# Patient Record
Sex: FEMALE | Race: BLACK OR AFRICAN AMERICAN | NOT HISPANIC OR LATINO
[De-identification: names, ages, dates, MRNs, and addresses within clinical notes are randomized per-mention and may not be internally consistent; named-entity substitution may affect disease eponyms.]

---

## 2017-07-17 ENCOUNTER — APPOINTMENT (OUTPATIENT)
Dept: RHEUMATOLOGY | Facility: CLINIC | Age: 42
End: 2017-07-17

## 2017-11-21 ENCOUNTER — APPOINTMENT (OUTPATIENT)
Dept: RHEUMATOLOGY | Facility: CLINIC | Age: 42
End: 2017-11-21

## 2018-06-25 ENCOUNTER — APPOINTMENT (OUTPATIENT)
Dept: RHEUMATOLOGY | Facility: CLINIC | Age: 43
End: 2018-06-25

## 2018-07-03 ENCOUNTER — APPOINTMENT (OUTPATIENT)
Dept: RHEUMATOLOGY | Facility: CLINIC | Age: 43
End: 2018-07-03
Payer: COMMERCIAL

## 2018-07-03 ENCOUNTER — LABORATORY RESULT (OUTPATIENT)
Age: 43
End: 2018-07-03

## 2018-07-03 ENCOUNTER — TRANSCRIPTION ENCOUNTER (OUTPATIENT)
Age: 43
End: 2018-07-03

## 2018-07-03 ENCOUNTER — MEDICATION RENEWAL (OUTPATIENT)
Age: 43
End: 2018-07-03

## 2018-07-03 VITALS
WEIGHT: 162 LBS | TEMPERATURE: 98 F | OXYGEN SATURATION: 98 % | HEART RATE: 80 BPM | HEIGHT: 68 IN | BODY MASS INDEX: 24.55 KG/M2 | SYSTOLIC BLOOD PRESSURE: 156 MMHG | DIASTOLIC BLOOD PRESSURE: 92 MMHG

## 2018-07-03 DIAGNOSIS — R59.9 ENLARGED LYMPH NODES, UNSPECIFIED: ICD-10-CM

## 2018-07-03 LAB
APTT BLD: 32.1 SEC
INR PPP: 1.12 RATIO
PT BLD: 12.7 SEC

## 2018-07-03 PROCEDURE — 99215 OFFICE O/P EST HI 40 MIN: CPT

## 2018-07-04 LAB
ALBUMIN SERPL ELPH-MCNC: 4.3 G/DL
ALP BLD-CCNC: 78 U/L
ALT SERPL-CCNC: 8 U/L
ANION GAP SERPL CALC-SCNC: 11 MMOL/L
APPEARANCE: CLEAR
AST SERPL-CCNC: 17 U/L
BACTERIA: NEGATIVE
BASOPHILS # BLD AUTO: 0.02 K/UL
BASOPHILS NFR BLD AUTO: 0.3 %
BILIRUB SERPL-MCNC: 0.4 MG/DL
BILIRUBIN URINE: NEGATIVE
BLOOD URINE: NEGATIVE
BUN SERPL-MCNC: 10 MG/DL
C3 SERPL-MCNC: 117 MG/DL
C4 SERPL-MCNC: 10 MG/DL
CALCIUM SERPL-MCNC: 9.8 MG/DL
CHLORIDE SERPL-SCNC: 100 MMOL/L
CK SERPL-CCNC: 129 U/L
CO2 SERPL-SCNC: 26 MMOL/L
COLOR: YELLOW
CREAT SERPL-MCNC: 0.76 MG/DL
CREAT SPEC-SCNC: 20 MG/DL
CREAT/PROT UR: NORMAL
DSDNA AB SER-ACNC: <12 IU/ML
EOSINOPHIL # BLD AUTO: 0.14 K/UL
EOSINOPHIL NFR BLD AUTO: 2.1 %
GLUCOSE QUALITATIVE U: NEGATIVE MG/DL
GLUCOSE SERPL-MCNC: 85 MG/DL
HCT VFR BLD CALC: 31.6 %
HGB BLD-MCNC: 9 G/DL
IMM GRANULOCYTES NFR BLD AUTO: 0.2 %
IRON SATN MFR SERPL: 6 %
IRON SERPL-MCNC: 20 UG/DL
KETONES URINE: NEGATIVE
LEUKOCYTE ESTERASE URINE: NEGATIVE
LYMPHOCYTES # BLD AUTO: 1.62 K/UL
LYMPHOCYTES NFR BLD AUTO: 24.8 %
MAN DIFF?: NORMAL
MCHC RBC-ENTMCNC: 19.4 PG
MCHC RBC-ENTMCNC: 28.5 GM/DL
MCV RBC AUTO: 68 FL
MICROSCOPIC-UA: NORMAL
MONOCYTES # BLD AUTO: 0.57 K/UL
MONOCYTES NFR BLD AUTO: 8.7 %
NEUTROPHILS # BLD AUTO: 4.18 K/UL
NEUTROPHILS NFR BLD AUTO: 63.9 %
NITRITE URINE: NEGATIVE
PH URINE: 6.5
PLATELET # BLD AUTO: 344 K/UL
POTASSIUM SERPL-SCNC: 4.2 MMOL/L
PROT SERPL-MCNC: 8.2 G/DL
PROT UR-MCNC: <4 MG/DL
PROTEIN URINE: NEGATIVE MG/DL
RBC # BLD: 4.65 M/UL
RBC # BLD: 4.65 M/UL
RBC # FLD: 19.4 %
RED BLOOD CELLS URINE: 0 /HPF
RETICS # AUTO: 0.8 %
RETICS AGGREG/RBC NFR: 37.3 K/UL
SODIUM SERPL-SCNC: 137 MMOL/L
SPECIFIC GRAVITY URINE: 1.01
SQUAMOUS EPITHELIAL CELLS: 1 /HPF
TIBC SERPL-MCNC: 358 UG/DL
TSH SERPL-ACNC: 1.65 UIU/ML
UIBC SERPL-MCNC: 338 UG/DL
UROBILINOGEN URINE: NEGATIVE MG/DL
WBC # FLD AUTO: 6.54 K/UL
WHITE BLOOD CELLS URINE: 0 /HPF

## 2018-07-05 LAB
ENA RNP AB SER IA-ACNC: >8 AL
ENA SM AB SER IA-ACNC: 0.4 AL
ENA SS-A AB SER IA-ACNC: 0.2 AL
ENA SS-B AB SER IA-ACNC: <0.2 AL

## 2018-07-06 LAB
B2 GLYCOPROT1 AB SER QL: NEGATIVE
B2 GLYCOPROT1 IGA SERPL IA-ACNC: <5 SAU
CARDIOLIPIN AB SER IA-ACNC: NEGATIVE

## 2019-07-16 ENCOUNTER — RX RENEWAL (OUTPATIENT)
Age: 44
End: 2019-07-16

## 2019-07-16 RX ORDER — IRON PS COMPLEX/B12/FOLIC ACID 150-25-1
150-1-25 CAPSULE ORAL
Qty: 90 | Refills: 3 | Status: ACTIVE | COMMUNITY
Start: 2019-07-16 | End: 1900-01-01

## 2019-10-14 ENCOUNTER — APPOINTMENT (OUTPATIENT)
Dept: RHEUMATOLOGY | Facility: CLINIC | Age: 44
End: 2019-10-14

## 2020-12-28 ENCOUNTER — TRANSCRIPTION ENCOUNTER (OUTPATIENT)
Age: 45
End: 2020-12-28

## 2021-01-04 ENCOUNTER — APPOINTMENT (OUTPATIENT)
Dept: RHEUMATOLOGY | Facility: CLINIC | Age: 46
End: 2021-01-04

## 2021-09-13 ENCOUNTER — APPOINTMENT (OUTPATIENT)
Dept: RHEUMATOLOGY | Facility: CLINIC | Age: 46
End: 2021-09-13
Payer: COMMERCIAL

## 2021-09-13 VITALS
TEMPERATURE: 98.3 F | BODY MASS INDEX: 25.76 KG/M2 | WEIGHT: 170 LBS | HEIGHT: 68 IN | HEART RATE: 80 BPM | SYSTOLIC BLOOD PRESSURE: 161 MMHG | RESPIRATION RATE: 17 BRPM | OXYGEN SATURATION: 98 % | DIASTOLIC BLOOD PRESSURE: 96 MMHG

## 2021-09-13 DIAGNOSIS — K21.9 GASTRO-ESOPHAGEAL REFLUX DISEASE W/OUT ESOPHAGITIS: ICD-10-CM

## 2021-09-13 PROCEDURE — 99215 OFFICE O/P EST HI 40 MIN: CPT

## 2021-09-14 LAB
ALBUMIN SERPL ELPH-MCNC: 4.4 G/DL
ALP BLD-CCNC: 75 U/L
ALT SERPL-CCNC: 10 U/L
ANION GAP SERPL CALC-SCNC: 11 MMOL/L
APPEARANCE: CLEAR
APTT BLD: 35.6 SEC
AST SERPL-CCNC: 16 U/L
BACTERIA: NEGATIVE
BASOPHILS # BLD AUTO: 0.03 K/UL
BASOPHILS NFR BLD AUTO: 0.5 %
BILIRUB SERPL-MCNC: 0.4 MG/DL
BILIRUBIN URINE: NEGATIVE
BLOOD URINE: NEGATIVE
BUN SERPL-MCNC: 11 MG/DL
C3 SERPL-MCNC: 105 MG/DL
C4 SERPL-MCNC: 8 MG/DL
CALCIUM SERPL-MCNC: 9.6 MG/DL
CHLORIDE SERPL-SCNC: 102 MMOL/L
CK SERPL-CCNC: 137 U/L
CO2 SERPL-SCNC: 26 MMOL/L
COLOR: COLORLESS
CONFIRM: 30.5 SEC
COVID-19 SPIKE DOMAIN ANTIBODY INTERPRETATION: POSITIVE
CREAT SERPL-MCNC: 0.71 MG/DL
CREAT SPEC-SCNC: 26 MG/DL
CREAT/PROT UR: 0.3 RATIO
DRVVT IMM 1:2 NP PPP: NORMAL
DRVVT SCREEN TO CONFIRM RATIO: 0.82 RATIO
DSDNA AB SER-ACNC: <12 IU/ML
EOSINOPHIL # BLD AUTO: 0.05 K/UL
EOSINOPHIL NFR BLD AUTO: 0.8 %
GLUCOSE QUALITATIVE U: NEGATIVE
HCT VFR BLD CALC: 35.9 %
HGB BLD-MCNC: 10.9 G/DL
HYALINE CASTS: 0 /LPF
IMM GRANULOCYTES NFR BLD AUTO: 0.3 %
INR PPP: 1.1 RATIO
KETONES URINE: NEGATIVE
LEUKOCYTE ESTERASE URINE: NEGATIVE
LYMPHOCYTES # BLD AUTO: 1.72 K/UL
LYMPHOCYTES NFR BLD AUTO: 27 %
MAN DIFF?: NORMAL
MCHC RBC-ENTMCNC: 26.1 PG
MCHC RBC-ENTMCNC: 30.4 GM/DL
MCV RBC AUTO: 86.1 FL
MICROSCOPIC-UA: NORMAL
MONOCYTES # BLD AUTO: 0.83 K/UL
MONOCYTES NFR BLD AUTO: 13.1 %
NEUTROPHILS # BLD AUTO: 3.71 K/UL
NEUTROPHILS NFR BLD AUTO: 58.3 %
NITRITE URINE: NEGATIVE
PH URINE: 6.5
PLATELET # BLD AUTO: 271 K/UL
POTASSIUM SERPL-SCNC: 4.4 MMOL/L
PROT SERPL-MCNC: 7.7 G/DL
PROT UR-MCNC: 8 MG/DL
PROTEIN URINE: NEGATIVE
PT BLD: 12.9 SEC
RBC # BLD: 4.17 M/UL
RBC # FLD: 16.9 %
RED BLOOD CELLS URINE: 0 /HPF
SARS-COV-2 AB SERPL IA-ACNC: >250 U/ML
SCREEN DRVVT: 33.4 SEC
SILICA CLOTTING TIME INTERPRETATION: NORMAL
SILICA CLOTTING TIME S/C: 0.8 RATIO
SODIUM SERPL-SCNC: 139 MMOL/L
SPECIFIC GRAVITY URINE: 1
SQUAMOUS EPITHELIAL CELLS: 2 /HPF
TSH SERPL-ACNC: 1.29 UIU/ML
UROBILINOGEN URINE: NORMAL
WBC # FLD AUTO: 6.36 K/UL
WHITE BLOOD CELLS URINE: 1 /HPF

## 2021-09-15 ENCOUNTER — TRANSCRIPTION ENCOUNTER (OUTPATIENT)
Age: 46
End: 2021-09-15

## 2021-09-15 LAB
B2 GLYCOPROT1 IGA SERPL IA-ACNC: <5 SAU
B2 GLYCOPROT1 IGG SER-ACNC: <5 SGU
B2 GLYCOPROT1 IGM SER-ACNC: <5 SMU
CARDIOLIPIN IGM SER-MCNC: 13.9 MPL
CARDIOLIPIN IGM SER-MCNC: <5 GPL
DEPRECATED CARDIOLIPIN IGA SER: <5 APL
ENA RNP AB SER IA-ACNC: >8 AL
ENA SM AB SER IA-ACNC: <0.2 AL
ENA SS-A AB SER IA-ACNC: <0.2 AL
ENA SS-B AB SER IA-ACNC: <0.2 AL
MYELOPEROXIDASE AB SER QL IA: <5 UNITS
MYELOPEROXIDASE CELLS FLD QL: NEGATIVE
PROTEINASE3 AB SER IA-ACNC: <5 UNITS
PROTEINASE3 AB SER-ACNC: NEGATIVE

## 2021-09-16 ENCOUNTER — TRANSCRIPTION ENCOUNTER (OUTPATIENT)
Age: 46
End: 2021-09-16

## 2022-11-01 ENCOUNTER — APPOINTMENT (OUTPATIENT)
Dept: RHEUMATOLOGY | Facility: CLINIC | Age: 47
End: 2022-11-01

## 2023-01-10 ENCOUNTER — APPOINTMENT (OUTPATIENT)
Dept: RHEUMATOLOGY | Facility: CLINIC | Age: 48
End: 2023-01-10
Payer: COMMERCIAL

## 2023-01-10 VITALS
SYSTOLIC BLOOD PRESSURE: 177 MMHG | DIASTOLIC BLOOD PRESSURE: 105 MMHG | OXYGEN SATURATION: 98 % | RESPIRATION RATE: 18 BRPM | BODY MASS INDEX: 25.54 KG/M2 | WEIGHT: 168 LBS | HEART RATE: 94 BPM

## 2023-01-10 DIAGNOSIS — M32.9 SYSTEMIC LUPUS ERYTHEMATOSUS, UNSPECIFIED: ICD-10-CM

## 2023-01-10 DIAGNOSIS — M60.9 MYOSITIS, UNSPECIFIED: ICD-10-CM

## 2023-01-10 DIAGNOSIS — I10 ESSENTIAL (PRIMARY) HYPERTENSION: ICD-10-CM

## 2023-01-10 DIAGNOSIS — M19.90 UNSPECIFIED OSTEOARTHRITIS, UNSPECIFIED SITE: ICD-10-CM

## 2023-01-10 PROCEDURE — 99214 OFFICE O/P EST MOD 30 MIN: CPT

## 2023-01-10 NOTE — ASSESSMENT
[FreeTextEntry1] : 1. SLE:  generally controlled.  No fever, arthritis, chest pain, rash, edema, alopecia, oral ulcers.  She had profound fatigue and about a 5 lb weight loss in prior years.  She stopped Plaquenil in .\par 2. Myositis: no weakness\par 3. Pregnancy: delivered third week of 2013 by  at term because of pre-eclampsia treated with MgSO4 (hypertension).  Was not given any antihypertensives at discharge.  Baby and mother did fine with the exception of hypertension developing in the mother (see below). The baby is doing well.\par 4. Chronic Plaquenil use: no visual symptoms\par 5. Hypertension:  BP arnaud after discharge requiring the initiation of an antihypertensive (amlodipine).  The dose was increased to 10 mg/d with eventual control of BP.  Eventually the drug was stopped.  She has not had any swelling or dyspnea.  She has not taken her BP meds on a regular basis, which is the case for the BP measurements taken in 2023.  \par 6.  Knee arthralgias/arthritis\par 7.  Vit D deficiency\par 8.  Adenopathy\par 9.  Arthritis: wrists, hands, knees\par 10. Social: father  in \par \par Plan:\par 1.  Lab tests\par 2.  Restart hydroxychloroquine\par 3.  Eye doctor\par 4.  Take valsartan daily; if BP still high, increase the dose\par 5.  Return 1-2 months\par 6.  Systemic Lupus Erythematosus, known as lupus, is a chronic autoimmune disease that can affect any organ in the body posing threats to proper organ function and even to life. Therefore, close surveillance of all bodily functions is required, including but not limited to central and peripheral nervous system, ocular and auditory systems, cardiopulmonary function, kidney function, mucocutaneous and musculoskeletal systems as well as constitutional manifestations. Surveillance consists of history, physical, and laboratory tests. Treatment varies, but most of the drugs used are high risk and therefore also require close monitoring in the form of blood and urine tests.\par

## 2023-01-10 NOTE — HISTORY OF PRESENT ILLNESS
[FreeTextEntry1] : 1. SLE:  generally controlled.  No fever, arthritis, chest pain, rash, edema, alopecia, oral ulcers.  She had profound fatigue and about a 5 lb weight loss in prior years.  She stopped Plaquenil in .\par 2. Myositis: no weakness\par 3. Pregnancy: delivered third week of 2013 by  at term because of pre-eclampsia treated with MgSO4 (hypertension).  Was not given any antihypertensives at discharge.  Baby and mother did fine with the exception of hypertension developing in the mother (see below). The baby is doing well.\par 4. Chronic Plaquenil use: no visual symptoms\par 5. Hypertension:  BP arnaud after discharge requiring the initiation of an antihypertensive (amlodipine).  The dose was increased to 10 mg/d with eventual control of BP.  Eventually the drug was stopped.  She has not had any swelling or dyspnea.  She has not taken her BP meds on a regular basis, which is the case for the BP measurements taken in 2023.  \par 6.  Knee arthralgias/arthritis\par 7.  Vit D deficiency\par 8.  Adenopathy\par 9.  Arthritis: wrists, hands, knees\par 10. Social: father  in \par \par Meds\par valsartan 160 mg/d stopped\par Plaquenil 400 mg daily stopped\par Iron prn \par MVI stopped\par \par Vaccines\par Prevnar 13  16   ([id number]; exp )

## 2023-01-10 NOTE — PHYSICAL EXAM
[General Appearance - Alert] : alert [General Appearance - In No Acute Distress] : in no acute distress [General Appearance - Well Nourished] : well nourished [General Appearance - Well Developed] : well developed [General Appearance - Well-Appearing] : healthy appearing [Sclera] : the sclera and conjunctiva were normal [Extraocular Movements] : extraocular movements were intact [Strabismus] : no strabismus was seen [Outer Ear] : the ears and nose were normal in appearance [Hearing Threshold Finger Rub Not [place]] : hearing was normal [Examination Of The Oral Cavity] : the lips and gums were normal [Nasal Cavity] : the nasal mucosa and septum were normal [Oropharynx] : the oropharynx was normal [Neck Appearance] : the appearance of the neck was normal [Neck Cervical Mass (___cm)] : no neck mass was observed [Jugular Venous Distention Increased] : there was no jugular-venous distention [Thyroid Diffuse Enlargement] : the thyroid was not enlarged [Respiration, Rhythm And Depth] : normal respiratory rhythm and effort [Exaggerated Use Of Accessory Muscles For Inspiration] : no accessory muscle use [Heart Rate And Rhythm] : heart rate was normal and rhythm regular [Heart Sounds] : normal S1 and S2 [Heart Sounds Gallop] : no gallops [Murmurs] : no murmurs [Heart Sounds Pericardial Friction Rub] : no pericardial rub [Arterial Pulses Carotid] : carotid pulses were normal with no bruits [Edema] : there was no peripheral edema [Veins - Varicosity Changes] : there were no varicosital changes [Bowel Sounds] : normal bowel sounds [Abdomen Soft] : soft [Abdomen Tenderness] : non-tender [Abdomen Mass (___ Cm)] : no abdominal mass palpated [No CVA Tenderness] : no ~M costovertebral angle tenderness [No Spinal Tenderness] : no spinal tenderness [Abnormal Walk] : normal gait [Nail Clubbing] : no clubbing  or cyanosis of the fingernails [Motor Tone] : muscle strength and tone were normal [FreeTextEntry1] : bilateral knee synovitis with effusions in both knees (worse on left) [Skin Color & Pigmentation] : normal skin color and pigmentation [Skin Turgor] : normal skin turgor [] : no rash [Sensation] : the sensory exam was normal to light touch and pinprick [Motor Exam] : the motor exam was normal [No Focal Deficits] : no focal deficits [Oriented To Time, Place, And Person] : oriented to person, place, and time [Impaired Insight] : insight and judgment were intact [Affect] : the affect was normal [Mood] : the mood was normal

## 2023-01-10 NOTE — DISCUSSION/SUMMARY
[FreeTextEntry1] : 1. SLE:  generally controlled.  No fever, arthritis, chest pain, rash, edema, alopecia, oral ulcers\par 2. Myositis: no weakness; last CK normal\par 3. Pregnancy: delivered third week of 2013 by  at term because of pre-eclampsia treated with MgSO4 (hypertension).  Was not given any antihypertensives at discharge.  Baby and mother did fine with the exception of hypertension developing in the mother (see below). \par 4. Chronic Plaquenil use: no visual symptoms\par 5. Hypertension:  BP arnaud after discharge requiring the initiation of an antihypertensive (amlodipine).  The dose was increased to 10 mg/d with eventual control of BP.  She has not had any swelling or dyspnea.  She has not taken her BP meds in two days but states that her BP was controlled when she did. \par \par Plan:\par 1. Lab tests\par 2. Favor restarting HCTZ, but she would like to check her BP at home or work before restarting\par 4. Return 4 weeks\par 5. Monitor BP at home\par

## 2023-01-11 LAB
25(OH)D3 SERPL-MCNC: 12.1 NG/ML
ALBUMIN SERPL ELPH-MCNC: 4.3 G/DL
ALP BLD-CCNC: 87 U/L
ALT SERPL-CCNC: 12 U/L
ANION GAP SERPL CALC-SCNC: 11 MMOL/L
AST SERPL-CCNC: 13 U/L
BASOPHILS # BLD AUTO: 0.03 K/UL
BASOPHILS NFR BLD AUTO: 0.5 %
BILIRUB SERPL-MCNC: 0.5 MG/DL
BUN SERPL-MCNC: 12 MG/DL
C3 SERPL-MCNC: 112 MG/DL
C4 SERPL-MCNC: 14 MG/DL
CALCIUM SERPL-MCNC: 9.6 MG/DL
CHLORIDE SERPL-SCNC: 101 MMOL/L
CK SERPL-CCNC: 148 U/L
CO2 SERPL-SCNC: 26 MMOL/L
CREAT SERPL-MCNC: 0.65 MG/DL
EGFR: 110 ML/MIN/1.73M2
ENA RNP AB SER IA-ACNC: >8 AL
ENA SM AB SER IA-ACNC: <0.2 AL
ENA SS-A AB SER IA-ACNC: <0.2 AL
ENA SS-B AB SER IA-ACNC: <0.2 AL
EOSINOPHIL # BLD AUTO: 0.11 K/UL
EOSINOPHIL NFR BLD AUTO: 1.7 %
HCT VFR BLD CALC: 35.6 %
HGB BLD-MCNC: 10.6 G/DL
IMM GRANULOCYTES NFR BLD AUTO: 0.3 %
LYMPHOCYTES # BLD AUTO: 1.57 K/UL
LYMPHOCYTES NFR BLD AUTO: 24.1 %
MAN DIFF?: NORMAL
MCHC RBC-ENTMCNC: 23.9 PG
MCHC RBC-ENTMCNC: 29.8 GM/DL
MCV RBC AUTO: 80.4 FL
MONOCYTES # BLD AUTO: 0.75 K/UL
MONOCYTES NFR BLD AUTO: 11.5 %
NEUTROPHILS # BLD AUTO: 4.04 K/UL
NEUTROPHILS NFR BLD AUTO: 61.9 %
PLATELET # BLD AUTO: 265 K/UL
POTASSIUM SERPL-SCNC: 3.9 MMOL/L
PROT SERPL-MCNC: 7.5 G/DL
RBC # BLD: 4.43 M/UL
RBC # FLD: 18.4 %
SODIUM SERPL-SCNC: 138 MMOL/L
WBC # FLD AUTO: 6.52 K/UL

## 2023-01-12 LAB — DSDNA AB SER-ACNC: <12 IU/ML

## 2023-01-13 RX ORDER — VALSARTAN 160 MG/1
160 TABLET, COATED ORAL
Qty: 90 | Refills: 3 | Status: ACTIVE | COMMUNITY
Start: 2023-01-10 | End: 1900-01-01

## 2023-01-14 ENCOUNTER — TRANSCRIPTION ENCOUNTER (OUTPATIENT)
Age: 48
End: 2023-01-14

## 2023-04-10 ENCOUNTER — APPOINTMENT (OUTPATIENT)
Dept: RHEUMATOLOGY | Facility: CLINIC | Age: 48
End: 2023-04-10

## 2024-02-20 ENCOUNTER — APPOINTMENT (OUTPATIENT)
Dept: RHEUMATOLOGY | Facility: CLINIC | Age: 49
End: 2024-02-20

## 2024-08-20 ENCOUNTER — APPOINTMENT (OUTPATIENT)
Dept: RHEUMATOLOGY | Facility: CLINIC | Age: 49
End: 2024-08-20
Payer: COMMERCIAL

## 2024-08-20 VITALS
HEIGHT: 68 IN | RESPIRATION RATE: 16 BRPM | DIASTOLIC BLOOD PRESSURE: 97 MMHG | BODY MASS INDEX: 25.61 KG/M2 | SYSTOLIC BLOOD PRESSURE: 150 MMHG | OXYGEN SATURATION: 98 % | HEART RATE: 75 BPM | WEIGHT: 169 LBS

## 2024-08-20 DIAGNOSIS — M60.9 MYOSITIS, UNSPECIFIED: ICD-10-CM

## 2024-08-20 DIAGNOSIS — I10 ESSENTIAL (PRIMARY) HYPERTENSION: ICD-10-CM

## 2024-08-20 DIAGNOSIS — M32.9 SYSTEMIC LUPUS ERYTHEMATOSUS, UNSPECIFIED: ICD-10-CM

## 2024-08-20 PROCEDURE — 99214 OFFICE O/P EST MOD 30 MIN: CPT

## 2024-08-20 NOTE — PHYSICAL EXAM
[General Appearance - Alert] : alert [General Appearance - In No Acute Distress] : in no acute distress [General Appearance - Well Nourished] : well nourished [General Appearance - Well Developed] : well developed [General Appearance - Well-Appearing] : healthy appearing [Sclera] : the sclera and conjunctiva were normal [Extraocular Movements] : extraocular movements were intact [Strabismus] : no strabismus was seen [Outer Ear] : the ears and nose were normal in appearance [Hearing Threshold Finger Rub Not [place]] : hearing was normal [Examination Of The Oral Cavity] : the lips and gums were normal [Nasal Cavity] : the nasal mucosa and septum were normal [Oropharynx] : the oropharynx was normal [Neck Appearance] : the appearance of the neck was normal [Neck Cervical Mass (___cm)] : no neck mass was observed [Jugular Venous Distention Increased] : there was no jugular-venous distention [Thyroid Diffuse Enlargement] : the thyroid was not enlarged [Respiration, Rhythm And Depth] : normal respiratory rhythm and effort [Exaggerated Use Of Accessory Muscles For Inspiration] : no accessory muscle use [Heart Rate And Rhythm] : heart rate was normal and rhythm regular [Heart Sounds] : normal S1 and S2 [Heart Sounds Gallop] : no gallops [Murmurs] : no murmurs [Heart Sounds Pericardial Friction Rub] : no pericardial rub [Arterial Pulses Carotid] : carotid pulses were normal with no bruits [Edema] : there was no peripheral edema [Veins - Varicosity Changes] : there were no varicosital changes [Bowel Sounds] : normal bowel sounds [Abdomen Soft] : soft [Abdomen Tenderness] : non-tender [Abdomen Mass (___ Cm)] : no abdominal mass palpated [No CVA Tenderness] : no ~M costovertebral angle tenderness [No Spinal Tenderness] : no spinal tenderness [Skin Color & Pigmentation] : normal skin color and pigmentation [Skin Turgor] : normal skin turgor [] : no rash [Sensation] : the sensory exam was normal to light touch and pinprick [Motor Exam] : the motor exam was normal [No Focal Deficits] : no focal deficits [Oriented To Time, Place, And Person] : oriented to person, place, and time [Impaired Insight] : insight and judgment were intact [Affect] : the affect was normal [Mood] : the mood was normal [Abnormal Walk] : normal gait [Nail Clubbing] : no clubbing  or cyanosis of the fingernails [Motor Tone] : muscle strength and tone were normal [FreeTextEntry1] : bilateral knee synovitis with small effusions in both knees

## 2024-08-20 NOTE — ASSESSMENT
[FreeTextEntry1] : 1. SLE:  generally controlled.  No fever, arthritis, chest pain, rash, edema, alopecia, oral ulcers.  She had profound fatigue and about a 5 lb weight loss in prior years.  She stopped Plaquenil in . 2. Myositis: no weakness 3. Pregnancy: delivered third week of 2013 by  at term because of pre-eclampsia treated with MgSO4 (hypertension).  Was not given any antihypertensives at discharge.  Baby and mother did fine with the exception of hypertension developing in the mother (see below). The baby is doing well. 4. Chronic Plaquenil use: no visual symptoms 5. Hypertension:  BP arnaud after discharge requiring the initiation of an antihypertensive (amlodipine).  The dose was increased to 10 mg/d with eventual control of BP.  Eventually the drug was stopped.  She has not had any swelling or dyspnea.  She has not taken her BP meds on a regular basis, which is the case for the BP measurements taken in 2023 as well as Aug 2024.  6.  Knee arthralgias/arthritis; wrists, hands, knees 7.  Vit D deficiency 8.  Adenopathy 9.  Social: father  in   Plan: 1.  Lab tests 2.  Restart hydroxychloroquine 3.  Eye doctor 4.  Take valsartan daily; if BP still high, increase the dose; follow-up A [place] 5.  Return 3 months 6.  Systemic Lupus Erythematosus, known as lupus, is a chronic autoimmune disease that can affect any organ in the body posing threats to proper organ function and even to life. Therefore, close surveillance of all bodily functions is required, including but not limited to central and peripheral nervous system, ocular and auditory systems, cardiopulmonary function, kidney function, mucocutaneous and musculoskeletal systems as well as constitutional manifestations. Surveillance consists of history, physical, and laboratory tests. Treatment varies, but most of the drugs used are high risk and therefore also require close monitoring in the form of blood and urine tests.

## 2024-08-20 NOTE — HISTORY OF PRESENT ILLNESS
[FreeTextEntry1] : 1. SLE:  generally controlled.  No fever, arthritis, chest pain, rash, edema, alopecia, oral ulcers.  She had profound fatigue and about a 5 lb weight loss in prior years.  She stopped Plaquenil in . 2. Myositis: no weakness 3. Pregnancy: delivered third week of 2013 by  at term because of pre-eclampsia treated with MgSO4 (hypertension).  Was not given any antihypertensives at discharge.  Baby and mother did fine with the exception of hypertension developing in the mother (see below). The baby is doing well. 4. Chronic Plaquenil use: no visual symptoms 5. Hypertension:  BP arnaud after discharge requiring the initiation of an antihypertensive (amlodipine).  The dose was increased to 10 mg/d with eventual control of BP.  Eventually the drug was stopped.  She has not had any swelling or dyspnea.  She has not taken her BP meds on a regular basis, which is the case for the BP measurements taken in 2023 as well as Aug 2024.  6.  Knee arthralgias/arthritis; wrists, hands, knees 7.  Vit D deficiency 8.  Adenopathy 9.  Social: father  in   Meds valsartan 320 mg/d irregularly Plaquenil 400 mg daily stopped Iron prn  MVI stopped  Vaccines Prevnar 13  16   ([id number]; exp )

## 2024-08-21 LAB
25(OH)D3 SERPL-MCNC: 19.5 NG/ML
ALBUMIN SERPL ELPH-MCNC: 4.8 G/DL
ALP BLD-CCNC: 93 U/L
ALT SERPL-CCNC: 17 U/L
ANION GAP SERPL CALC-SCNC: 11 MMOL/L
AST SERPL-CCNC: 19 U/L
BASOPHILS # BLD AUTO: 0.03 K/UL
BASOPHILS NFR BLD AUTO: 0.5 %
BILIRUB SERPL-MCNC: 0.5 MG/DL
BUN SERPL-MCNC: 11 MG/DL
C3 SERPL-MCNC: 151 MG/DL
C4 SERPL-MCNC: 17 MG/DL
CALCIUM SERPL-MCNC: 10.4 MG/DL
CHLORIDE SERPL-SCNC: 101 MMOL/L
CK SERPL-CCNC: 152 U/L
CO2 SERPL-SCNC: 27 MMOL/L
CREAT SERPL-MCNC: 0.7 MG/DL
DSDNA AB SER-ACNC: 1 IU/ML
EGFR: 107 ML/MIN/1.73M2
ENA RNP AB SER IA-ACNC: 7.1 AL
ENA SM AB SER IA-ACNC: <0.2 AL
ENA SS-A AB SER IA-ACNC: <0.2 AL
ENA SS-B AB SER IA-ACNC: <0.2 AL
EOSINOPHIL # BLD AUTO: 0.09 K/UL
EOSINOPHIL NFR BLD AUTO: 1.4 %
HCT VFR BLD CALC: 37.6 %
HGB BLD-MCNC: 11.5 G/DL
IMM GRANULOCYTES NFR BLD AUTO: 0.2 %
LYMPHOCYTES # BLD AUTO: 1.83 K/UL
LYMPHOCYTES NFR BLD AUTO: 28 %
MAN DIFF?: NORMAL
MCHC RBC-ENTMCNC: 26.7 PG
MCHC RBC-ENTMCNC: 30.6 GM/DL
MCV RBC AUTO: 87.2 FL
MONOCYTES # BLD AUTO: 0.58 K/UL
MONOCYTES NFR BLD AUTO: 8.9 %
NEUTROPHILS # BLD AUTO: 3.99 K/UL
NEUTROPHILS NFR BLD AUTO: 61 %
PLATELET # BLD AUTO: 247 K/UL
POTASSIUM SERPL-SCNC: 3.9 MMOL/L
PROT SERPL-MCNC: 8.1 G/DL
RBC # BLD: 4.31 M/UL
RBC # FLD: 15.7 %
SODIUM SERPL-SCNC: 139 MMOL/L
TSH SERPL-ACNC: 1.01 UIU/ML
WBC # FLD AUTO: 6.53 K/UL

## 2024-08-22 ENCOUNTER — TRANSCRIPTION ENCOUNTER (OUTPATIENT)
Age: 49
End: 2024-08-22

## 2024-08-24 LAB — HYDROXYCHLOROQUINE CONCENTRATION: <10 NG/ML

## 2025-02-24 ENCOUNTER — APPOINTMENT (OUTPATIENT)
Dept: RHEUMATOLOGY | Facility: CLINIC | Age: 50
End: 2025-02-24